# Patient Record
Sex: FEMALE | Race: BLACK OR AFRICAN AMERICAN | Employment: UNEMPLOYED | URBAN - METROPOLITAN AREA
[De-identification: names, ages, dates, MRNs, and addresses within clinical notes are randomized per-mention and may not be internally consistent; named-entity substitution may affect disease eponyms.]

---

## 2020-02-07 ENCOUNTER — HOSPITAL ENCOUNTER (EMERGENCY)
Age: 65
Discharge: HOME | End: 2020-02-07
Payer: MEDICARE

## 2020-02-07 VITALS
WEIGHT: 141.09 LBS | TEMPERATURE: 98 F | SYSTOLIC BLOOD PRESSURE: 130 MMHG | RESPIRATION RATE: 17 BRPM | HEART RATE: 80 BPM | DIASTOLIC BLOOD PRESSURE: 86 MMHG | HEIGHT: 61 IN | BODY MASS INDEX: 26.64 KG/M2 | OXYGEN SATURATION: 96 %

## 2020-02-07 PROCEDURE — 99282 EMERGENCY DEPT VISIT SF MDM: CPT

## 2020-02-07 RX ORDER — CEPHALEXIN 500 MG/1
500 CAPSULE ORAL 4 TIMES DAILY
Qty: 28 CAPSULE | Refills: 0 | Status: SHIPPED | OUTPATIENT
Start: 2020-02-07 | End: 2020-02-14

## 2020-02-07 SDOH — HEALTH STABILITY: MENTAL HEALTH: HOW OFTEN DO YOU HAVE A DRINK CONTAINING ALCOHOL?: NEVER

## 2020-02-07 ASSESSMENT — ENCOUNTER SYMPTOMS
COUGH: 0
SHORTNESS OF BREATH: 0
BACK PAIN: 0
EYE PAIN: 0
DIARRHEA: 0
NAUSEA: 0
ABDOMINAL PAIN: 0
VOMITING: 0

## 2020-02-07 ASSESSMENT — PAIN DESCRIPTION - PAIN TYPE
TYPE: ACUTE PAIN
TYPE: ACUTE PAIN

## 2020-02-07 ASSESSMENT — PAIN SCALES - GENERAL
PAINLEVEL_OUTOF10: 9
PAINLEVEL_OUTOF10: 9

## 2020-02-07 ASSESSMENT — PAIN DESCRIPTION - ORIENTATION
ORIENTATION: LEFT
ORIENTATION: LEFT

## 2020-02-07 ASSESSMENT — PAIN DESCRIPTION - LOCATION
LOCATION: EYE
LOCATION: EYE

## 2020-02-07 ASSESSMENT — PAIN DESCRIPTION - DESCRIPTORS
DESCRIPTORS: THROBBING
DESCRIPTORS: THROBBING

## 2020-02-07 ASSESSMENT — PAIN DESCRIPTION - FREQUENCY: FREQUENCY: CONTINUOUS

## 2020-02-07 ASSESSMENT — PAIN - FUNCTIONAL ASSESSMENT: PAIN_FUNCTIONAL_ASSESSMENT: 0-10

## 2020-02-07 NOTE — ED PROVIDER NOTES
Skin: Negative for rash. Neurological: Negative for dizziness and headaches. Psychiatric/Behavioral: Negative for confusion. Positives and Pertinent negatives as per HPI. Except as noted above in the ROS, problem specific ROS was completed and is negative. Physical Exam:  Physical Exam  Vitals signs and nursing note reviewed. Constitutional:       General: She is not in acute distress. Appearance: She is well-developed. She is not diaphoretic. HENT:      Head: Normocephalic and atraumatic. Comments: See attached picture, approximately 3 x 3 cm area of induration fluctuance with central head, small scabbed over area where it was already draining earlier today  Eyes:      General:         Right eye: No discharge. Left eye: No discharge. Extraocular Movements: Extraocular movements intact. Conjunctiva/sclera: Conjunctivae normal.      Pupils: Pupils are equal, round, and reactive to light. Neck:      Musculoskeletal: Normal range of motion and neck supple. Pulmonary:      Effort: No respiratory distress. Breath sounds: No stridor. Musculoskeletal: Normal range of motion. Skin:     General: Skin is warm and dry. Coloration: Skin is not pale. Neurological:      Mental Status: She is alert and oriented to person, place, and time. Comments: No gross facial drooping. Moves all 4 extremities spontaneously. Psychiatric:         Behavior: Behavior normal.             MEDICAL DECISION MAKING    Vitals:    Vitals:    02/07/20 1746   BP: 130/86   Pulse: 80   Resp: 17   Temp: 98 °F (36.7 °C)   TempSrc: Oral   SpO2: 96%   Weight: 141 lb 1.5 oz (64 kg)   Height: 5' 1\" (1.549 m)       LABS:Labs Reviewed - No data to display     Remainder of labs reviewed and werenegative at this time or not returned at the time of this note.     RADIOLOGY:   Non-plain film images such as CT, Ultrasound and MRI are read by the radiologist. DAYO Adams have directly visualized the radiologic plain film image(s) with the below findings:        Interpretation per the Radiologist below, if available at the time of this note:    No orders to display        No results found. MEDICAL DECISION MAKING / ED COURSE:      PROCEDURES:   Procedures    None    Patient was given:  Medications - No data to display    Differential diagnosis: necrotizing fasciitis, deep space soft tissue bacterial skin infection, viral rash, systemic infectious rash, aseptic cyst, malignancy, other    Afebrile hemodynamically stable well-appearing female seen and evaluated as detailed above for 3-day history of facial abscess. Already drained earlier today. Still some fluctuance appreciated. There is no extension into the orbits. Extraocular movements are intact. She has no findings to suggest severe cellulitis. Given the location on her face close to the eye, I will refer to plastic surgery for further evaluation and definitive management within the next few days. Recommended Epsom salt warm compresses and will start on Keflex. I estimate there is LOW risk for SEVERE CELLULITIS, SEPSIS OR NECROTIZING FASCIITIS,  thus I consider the discharge disposition reasonable. At this time I believe patient's presentation does not warrant further workup with labs or imaging in the emergency department and is stable for discharge home. They verbalized understanding and were discharged in stable condition. Kelly Bhardwaj is well appearing, non-toxic, and afebrile at the time of discharge. The patient tolerated their visit well. I evaluated the patient. The physician was available for consultation as needed. The patient and / or the family were informed of the results of any tests, a time was given to answer questions, a plan was proposed and they agreed with plan. CLINICAL IMPRESSION:  1.  Cutaneous abscess of face        DISPOSITION Decision To Discharge 02/07/2020 06:20:22 PM      PATIENT REFERRED TO:  Lorie Braun MD  [address] [facility] [address]  [phone]      ED follow up with plastic surgeon      DISCHARGE MEDICATIONS:  New Prescriptions    CEPHALEXIN (KEFLEX) 500 MG CAPSULE    Take 1 capsule by mouth 4 times daily for 7 days       DISCONTINUED MEDICATIONS:  Discontinued Medications    No medications on file              (Please note the MDM and HPI sections of this note were completed with a voice recognition program.  Efforts were made to edit the dictations but occasionally words are mis-transcribed.)    Electronically signed, Anand Vila,           Annad Vila  02/07/20 6602

## 2021-11-30 ENCOUNTER — HOSPITAL ENCOUNTER (OUTPATIENT)
Dept: MAMMOGRAPHY | Age: 66
Discharge: HOME | End: 2021-12-05
Payer: MEDICARE

## 2021-11-30 VITALS — BODY MASS INDEX: 26.06 KG/M2 | HEIGHT: 61 IN | WEIGHT: 138 LBS

## 2021-11-30 DIAGNOSIS — Z12.31 VISIT FOR SCREENING MAMMOGRAM: ICD-10-CM

## 2021-11-30 PROCEDURE — 77067 SCR MAMMO BI INCL CAD: CPT
